# Patient Record
Sex: MALE | Race: WHITE
[De-identification: names, ages, dates, MRNs, and addresses within clinical notes are randomized per-mention and may not be internally consistent; named-entity substitution may affect disease eponyms.]

---

## 2017-03-28 ENCOUNTER — HOSPITAL ENCOUNTER (OUTPATIENT)
Dept: HOSPITAL 45 - C.ONC | Age: 67
Discharge: HOME | End: 2017-03-28
Attending: PHYSICIAN ASSISTANT
Payer: COMMERCIAL

## 2017-03-28 VITALS
SYSTOLIC BLOOD PRESSURE: 121 MMHG | HEART RATE: 84 BPM | DIASTOLIC BLOOD PRESSURE: 69 MMHG | TEMPERATURE: 98.42 F | OXYGEN SATURATION: 96 %

## 2017-03-28 DIAGNOSIS — Z08: Primary | ICD-10-CM

## 2017-03-28 DIAGNOSIS — Z92.3: ICD-10-CM

## 2017-03-28 DIAGNOSIS — Z85.46: ICD-10-CM

## 2017-03-28 NOTE — RADIATION ONCOLOGY FOLLOW-UP
Radiation Oncology Follow-Up


Date of Visit


Mar 28, 2017.





Reason For Visit


Annual follow-up





Radiation Completion Date


6/28/13





Diagnosis





(1) Prostate cancer


Status:  Resolved        Onset Date:  1/10/2013


Location:  right lobe of the prostate


Histology Subtype:  adenocarcinoma


Stage:  ll


Permanent Comment:  Rising PSA, pretreatment PSA 4.23


Status post biopsies.  Biopsy stage T2b, Jewell 3+4


Status post completion of radiation therapy utilizing IMRT/IGRT completed 06/28/ 2013 received 7840 cGy  Last Edited By: Sarah Leonard on Feb 9, 2016 14:02





Interim History


He has been doing well over this past year from urinary standpoint.  His AUA 

score was 3.  He completed expanded prostate cancer index composite for 

clinical practice with the aid of his brother.  He gave a score of 0 of 12 

urinary incontinence symptoms.  He gave a score of 0 of 12 and urinary 

irritation symptoms.  He gave a score of 0 12 bowel symptoms.  The sexual 

symptom category was not completed.  He gave a score of 0 12 and hormonal 

vitality symptoms.  His total with 0/48.  He had a PSA 09/01/2016 and that was 

0.175.





Allergies


Uncoded Allergies:  


     NKDA (Allergy, Unknown, 9/19/05)





Home Medications


Scheduled


Atorvastatin (Lipitor), 10 MG PO DAILY


Calcium (Caltrate), 600 MG PO DAILY


Carbidopa/Levodopa (Sinemet Cr 25MG/100MG), 1 TAB PO TID


Cholecalciferol (Vitamin D3), 2 CAP PO DAILY


Hydrochlorothiazide (Hctz), 25 MG PO DAILY


Lisinopril (Prinivil), 10 MG PO DAILY


Ocuvite Preservision (Ocuvite Preservision), 1 TAB PO BID


Selegiline Hcl (Eldepryl), 5 MG PO BID


Tocopheryl Acet,Dl-Alpha (Vitamin E), 400 INTER.UNIT PO DAILY


[co Q-10], 600 MG BID





Review of Systems


Gastrointestinal:  


   Symptoms:  WNL


   GI Comments:  No fiber supplements;


Oral:  


   Symptoms:  No Problems


Respiratory:  


   Symptoms:  WNL


Urinary:  


   Symptoms:  WNL


   Comments:  3 voids/night;


Skin:  


   Symptoms:  No Problems





Physical Exam





Vital Signs








  Date Time  Temp Pulse Resp B/P Pulse Ox O2 Delivery O2 Flow Rate FiO2


 


3/28/17 14:27 36.9 84 24 121/69 96   








Pain:  


   Pain Location:  None


   Patient Pain Scale:  0 - 10


   Initial Pain Intensity:  0.0


General Appearance:  no apparent distress


Eyes:  normal inspection, EOMI


ENT:  normal ENT inspection, + pertinent finding (very decreased hearing he 

does have some wax in the left ear.)


Neck:  no adenopathy, thyroid normal


Respiratory/Chest:  lungs clear, no respiratory distress, no accessory muscle 

use


Cardiovascular:  regular rate, rhythm, no gallop, no murmur


Abdomen:  non tender, soft


Anal / Rectum:


Rectal examination reveals prostate to be smooth without nodules no rectal 

masses no rectal bleeding.


Extremities:  no pedal edema


Neurologic/Psychiatric:  no motor/sensory deficits, alert, normal mood/affect


Skin:  warm/dry


Lymphatic:  no adenopathy





Laboratory Studies











Test


  3/28/17


14:43


 


Prostate Specific Antigen


  0.164 ng/ml


(0.000-4.000)











Assessment & Plan


Plan: A PSA was drawn today prior to examination he'll be notified as to 

results.  His brother has noted decreased hearing acuity.  He did have some wax 

on left.  I've asked him to see Dr. Gaston if his continued to have problems 

with his hearing.  He'll continue regular follow-up with Dr. Henry.  We 

asked him to return to our office in 1 year.  He may call if she has a 

questions or concerns in the interim.





Total Time In Follow-Up


I spent 20 minutes speaking to the patient performing examination.  I spent 15 

minutes reviewing information in completing this note.





Copy To


Dennis Gaston M.D.; Brady Henry MD

## 2017-04-20 ENCOUNTER — HOSPITAL ENCOUNTER (OUTPATIENT)
Dept: HOSPITAL 45 - C.LABBC | Age: 67
Discharge: HOME | End: 2017-04-20
Attending: INTERNAL MEDICINE
Payer: COMMERCIAL

## 2017-04-20 DIAGNOSIS — R73.03: ICD-10-CM

## 2017-04-20 DIAGNOSIS — E78.5: ICD-10-CM

## 2017-04-20 DIAGNOSIS — I10: ICD-10-CM

## 2017-04-20 DIAGNOSIS — G20: Primary | ICD-10-CM

## 2017-04-20 LAB
ALBUMIN/GLOB SERPL: 1.3 {RATIO} (ref 0.9–2)
ALP SERPL-CCNC: 60 U/L (ref 45–117)
ALT SERPL-CCNC: 26 U/L (ref 12–78)
ANION GAP SERPL CALC-SCNC: 5 MMOL/L (ref 3–11)
AST SERPL-CCNC: 16 U/L (ref 15–37)
BASOPHILS # BLD: 0.09 K/UL (ref 0–0.2)
BASOPHILS NFR BLD: 1.8 %
BUN SERPL-MCNC: 20 MG/DL (ref 7–18)
BUN/CREAT SERPL: 26.4 (ref 10–20)
CALCIUM SERPL-MCNC: 8.8 MG/DL (ref 8.5–10.1)
CHLORIDE SERPL-SCNC: 104 MMOL/L (ref 98–107)
CHOLEST/HDLC SERPL: 3 {RATIO}
CO2 SERPL-SCNC: 31 MMOL/L (ref 21–32)
COMPLETE: YES
CREAT SERPL-MCNC: 0.77 MG/DL (ref 0.6–1.4)
EOSINOPHIL NFR BLD AUTO: 296 K/UL (ref 130–400)
EST. AVERAGE GLUCOSE BLD GHB EST-MCNC: 126 MG/DL
GLOBULIN SER-MCNC: 3 GM/DL (ref 2.5–4)
GLUCOSE SERPL-MCNC: 110 MG/DL (ref 70–99)
GLUCOSE UR QL: 49 MG/DL
HCT VFR BLD CALC: 44 % (ref 42–52)
IG%: 0.2 %
IMM GRANULOCYTES NFR BLD AUTO: 19.4 %
KETONES UR QL STRIP: 84 MG/DL
LYMPHOCYTES # BLD: 0.99 K/UL (ref 1.2–3.4)
MCH RBC QN AUTO: 30.7 PG (ref 25–34)
MCHC RBC AUTO-ENTMCNC: 34.3 G/DL (ref 32–36)
MCV RBC AUTO: 89.4 FL (ref 80–100)
MONOCYTES NFR BLD: 8.8 %
NEUTROPHILS # BLD AUTO: 4.9 %
NEUTROPHILS NFR BLD AUTO: 64.9 %
NITRITE UR QL STRIP: 69 MG/DL (ref 0–150)
PH UR: 147 MG/DL (ref 0–200)
PMV BLD AUTO: 9.3 FL (ref 7.4–10.4)
POTASSIUM SERPL-SCNC: 4.3 MMOL/L (ref 3.5–5.1)
RBC # BLD AUTO: 4.92 M/UL (ref 4.7–6.1)
SODIUM SERPL-SCNC: 140 MMOL/L (ref 136–145)
TSH SERPL-ACNC: 1.26 UIU/ML (ref 0.3–4.5)
VERY LOW DENSITY LIPOPROT CALC: 14 MG/DL
WBC # BLD AUTO: 5.11 K/UL (ref 4.8–10.8)

## 2017-04-26 NOTE — CODING QUERY MEDICAL NECESSITY
SUPPORTING DIAGNOSIS NEEDED





A supporting diagnosis is required for the test/procedure performed on this patient in 
order for us to be reimbursed by the patient's insurance. Please provide a supporting 
diagnosis for the following test/procedure listed below next to the test name along with 
your signature. 



*If there is no additional diagnosis for this patient that would support the following 
test/procedure please document that below next to the test/procedure.



Test(s)/Procedure(s) that require a supporting diagnosis:

DOS 4/20

* Hba1c      DIAGNOSIS:

* TSH      DIAGNOSIS:

* Lipids      DIAGNOSIS:



Provider Signature:  ______________________________  Date:  _______



Thank you  

Juliana Sibley

Health Information Management

Phone:  [phone]

Fax:  [phone]



Once completed, please kindly fax back to [phone]



For questions please call [phone]

## 2017-09-05 ENCOUNTER — HOSPITAL ENCOUNTER (OUTPATIENT)
Dept: HOSPITAL 45 - C.LABBC | Age: 67
Discharge: HOME | End: 2017-09-05
Attending: UROLOGY
Payer: COMMERCIAL

## 2017-09-05 DIAGNOSIS — R39.9: ICD-10-CM

## 2017-09-05 DIAGNOSIS — C61: Primary | ICD-10-CM

## 2018-04-03 ENCOUNTER — HOSPITAL ENCOUNTER (OUTPATIENT)
Dept: HOSPITAL 45 - C.ONC | Age: 68
Discharge: HOME | End: 2018-04-03
Attending: PHYSICIAN ASSISTANT
Payer: COMMERCIAL

## 2018-04-03 VITALS
TEMPERATURE: 98.06 F | OXYGEN SATURATION: 96 % | HEART RATE: 84 BPM | DIASTOLIC BLOOD PRESSURE: 72 MMHG | SYSTOLIC BLOOD PRESSURE: 143 MMHG

## 2018-04-03 DIAGNOSIS — Z92.3: ICD-10-CM

## 2018-04-03 DIAGNOSIS — Z85.46: ICD-10-CM

## 2018-04-03 DIAGNOSIS — Z08: Primary | ICD-10-CM

## 2018-04-03 NOTE — RADIATION ONCOLOGY FOLLOW-UP
Radiation Oncology Follow-Up


Date of Visit


Apr 3, 2018.





Reason For Visit


Annual follow up





Radiation Completion Date


6/28/13





Diagnosis





(1) Prostate cancer


Status:  Resolved        Onset Date:  1/10/2013


Stage:  ll


Permanent Comment:  Rising PSA, pretreatment PSA 4.23


Status post biopsies.  Biopsy stage T2b, Jewell 3+4


Status post completion of radiation therapy utilizing IMRT/IGRT completed 06/28/ 2013 received 7840 cGy  Last Edited By: Sarah Leonard on Feb 9, 2016 14:02





Interim History


He has been doing well over this past year.  He has noted no change in urinary 

habits.  Today he gave an AUA score of 3.  He completed an expanded prostate 

cancer index composite for clinical practice and gave a score of 0 of 12 and 

urinary incontinence symptoms.  He gives score of 1 of 12 and urinary 

irritation symptoms.  He gave a score of 0 of 12 and bowel symptoms.  He did 

not complete the sexual questionnaire.  He gave a score 2 of 12 and hormonal 

vitality symptoms.  His total was 3 of 48.  His last PSA was September 5, 2017.

  That was 0.259.





Allergies


Uncoded Allergies:  


     NKDA (Allergy, Unknown, 9/19/05)





Home Medications


Scheduled


Atorvastatin (Lipitor), 10 MG PO DAILY


Calcium (Caltrate), 600 MG PO DAILY


Carbidopa/Levodopa (Sinemet Cr 25MG/100MG), 1 TAB PO TID


Cholecalciferol (Vitamin D3), 2 CAP PO DAILY


Hydrochlorothiazide (Hctz), 25 MG PO DAILY


Lisinopril (Prinivil), 10 MG PO DAILY


Ocuvite Preservision (Ocuvite Preservision), 1 TAB PO BID


Selegiline Hcl (Eldepryl), 5 MG PO BID


Tocopheryl Acet,Dl-Alpha (Vitamin E), 400 INTER.UNIT PO DAILY


[co Q-10], 600 MG BID





Review of Systems


Gastrointestinal:  


   Symptoms:  WNL


   GI Comments:  No fiber supplements;


Oral:  


   Symptoms:  No Problems


Respiratory:  


   Symptoms:  WNL


Urinary:  


   Symptoms:  Nocturia


   Comments:  3 or4 times a night


Skin:  


   Symptoms:  No Problems





Physical Exam





Vital Signs








  Date Time  Temp Pulse Resp B/P (MAP) Pulse Ox O2 Delivery O2 Flow Rate FiO2


 


4/3/18 12:57 36.7 84 18 143/72 96   








Fatigue:  None


General Appearance:  no apparent distress


Eyes:  normal inspection, EOMI


ENT:  normal ENT inspection, hearing grossly normal


Neck:  no adenopathy, thyroid normal


Respiratory/Chest:  lungs clear, no respiratory distress, no accessory muscle 

use


Cardiovascular:  regular rate, rhythm, no gallop, no murmur


Abdomen:  non tender, soft, no organomegaly


Anal / Rectum:


Normal sphincter tone.  No rectal masses no rectal bleeding.  Prostate without 

nodules.


Extremities:  no pedal edema


Neurologic/Psychiatric:  no motor/sensory deficits, alert, normal mood/affect





Pain Management


Patient Reports Pain:  No


Pain Location:  None


Patient Preferred Pain Scale:  0 - 10


Initial Pain Intensity:  0.0


Pain Management Plan


He denies pain therefore requires no pain management.





Laboratory


Laboratory Results:  were reviewed, and pertinent findings noted below


Laboratory Comments:











Test


  4/3/18


13:10


 


Prostate Specific Antigen


  0.154 ng/ml


(0.000-4.000)











Pathology


Pathology Results:  not applicable





Imaging


Imaging Studies:  not applicable





Assessment & Plan


Plan: Continue regular follow-up with Dr. Henry.  He has an appointment on 

September 25.  He will have a PSA prior to that visit.  He does not require any 

medication to help with urination.  He is accompanied today with his brother.  

We discussed continue follow-up now with urology alone.  A follow-up 

appointment with our office was not given.  We had previously discussed 

biochemical failure.  We once again reviewed this information.  The PSA was 

drawn today prior to examination.  He will be notified as to the results.  They 

may call our office if they have any questions or concerns would be happy to 

speak to them.





Total Time


In Follow-Up


I spent 20 minutes speaking to the patient and his brother and performing 

examination.  I spent 15 minutes reviewing information and completing this note.





Copy To


Dennis Gaston M.D.; Brady Henry MD

## 2018-05-03 ENCOUNTER — HOSPITAL ENCOUNTER (OUTPATIENT)
Dept: HOSPITAL 45 - C.LABBC | Age: 68
Discharge: HOME | End: 2018-05-03
Attending: INTERNAL MEDICINE
Payer: COMMERCIAL

## 2018-05-03 DIAGNOSIS — R73.03: Primary | ICD-10-CM

## 2018-05-03 DIAGNOSIS — E78.5: ICD-10-CM

## 2018-05-03 DIAGNOSIS — Z11.59: ICD-10-CM

## 2018-05-03 DIAGNOSIS — I10: ICD-10-CM

## 2018-05-03 LAB
ALBUMIN SERPL-MCNC: 4 GM/DL (ref 3.4–5)
ALP SERPL-CCNC: 75 U/L (ref 45–117)
ALT SERPL-CCNC: 34 U/L (ref 12–78)
AST SERPL-CCNC: 21 U/L (ref 15–37)
BUN SERPL-MCNC: 20 MG/DL (ref 7–18)
CALCIUM SERPL-MCNC: 9.4 MG/DL (ref 8.5–10.1)
CO2 SERPL-SCNC: 30 MMOL/L (ref 21–32)
CREAT SERPL-MCNC: 0.82 MG/DL (ref 0.6–1.4)
GLUCOSE SERPL-MCNC: 103 MG/DL (ref 70–99)
HBA1C MFR BLD: 6.1 % (ref 4.5–5.6)
KETONES UR QL STRIP: 87 MG/DL
PH UR: 161 MG/DL (ref 0–200)
POTASSIUM SERPL-SCNC: 4 MMOL/L (ref 3.5–5.1)
PROT SERPL-MCNC: 7.6 GM/DL (ref 6.4–8.2)
SODIUM SERPL-SCNC: 137 MMOL/L (ref 136–145)

## 2018-05-18 ENCOUNTER — HOSPITAL ENCOUNTER (OUTPATIENT)
Dept: HOSPITAL 45 - C.RADBC | Age: 68
Discharge: HOME | End: 2018-05-18
Attending: INTERNAL MEDICINE
Payer: COMMERCIAL

## 2018-05-18 DIAGNOSIS — M17.12: ICD-10-CM

## 2018-05-18 DIAGNOSIS — M16.10: ICD-10-CM

## 2018-05-18 DIAGNOSIS — M11.252: ICD-10-CM

## 2018-05-18 DIAGNOSIS — M47.896: Primary | ICD-10-CM

## 2018-05-18 NOTE — DIAGNOSTIC IMAGING REPORT
L KNEE 1 OR 2 VIEWS ROUTINE



HISTORY:  67 years-old Male M79.605 Left leg pain acute left knee pain



COMPARISON: None available



TECHNIQUE: 2 views of the left knee



FINDINGS: 

Mild lateral compartment with moderate medial and patellofemoral compartment

osteoarthritis. Chondrocalcinosis is also seen. Small joint effusion with mild

circumferential soft tissue swelling about the knee, greatest anteromedially. 3

mm calcification about the medial femoral condyle suggesting dystrophic

calcification. No acute fracture or dislocation. Peripheral arterial

calcifications noted.



IMPRESSION: 

1. Tricompartment osteoarthritis without acute fracture or dislocation.

2. Small joint effusion with mild anteromedial soft tissue swelling.

3. Peripheral arterial disease. 







The above report was generated using voice recognition software. It may contain

grammatical, syntax or spelling errors.







Electronically signed by:  Junior Baer M.D.

5/18/2018 12:24 PM



Dictated Date/Time:  5/18/2018 12:23 PM

## 2018-05-18 NOTE — DIAGNOSTIC IMAGING REPORT
R HIP UNILATERAL 2 VIEWS, L HIP UNILATERAL 2 VIEWS



HISTORY:  67 years-old Male PAIN chronic bilateral hip pain



COMPARISON: None available



TECHNIQUE: 2 views of the bilateral hips



FINDINGS: 



RIGHT:

Metallic seeds overlie the distribution of the prostate. Mild to moderate right

and posterior arthritis. No acute fracture or dislocation.



LEFT:

Mild to moderate osteoarthritis with chondrocalcinosis of the femoral acetabular

joint. No acute fracture or dislocation. No opaque foreign body.



IMPRESSION: Mild to moderate bilateral hip osteoarthritis without acute fracture

or dislocation. 







The above report was generated using voice recognition software. It may contain

grammatical, syntax or spelling errors.







Electronically signed by:  Junior Baer M.D.

5/18/2018 12:00 PM



Dictated Date/Time:  5/18/2018 11:58 AM

## 2018-05-18 NOTE — DIAGNOSTIC IMAGING REPORT
L-SPINE MIN 4 VIEWS ROUTINE



CLINICAL HISTORY: 67 years-old Male presenting with M54.5 Bilateral low back

pain without sciatica. 



TECHNIQUE: Frontal, bilateral oblique, lateral, and coned in lateral views of

the lumbar spine were obtained.



COMPARISON: None.



FINDINGS:

Cholecystectomy clips noted. Dextrocurvature of the lumbar spine. Normal lumbar

lordosis. Vertebral bodies maintain normal height and alignment. Intervertebral

disc heights preserved. Mild anterior osteophytosis. No radiographic evidence of

osseous neural foraminal narrowing. No compression deformity. No subluxation.



Calculus suggested at the lower pole of the left kidney.



IMPRESSION:

1.  Mild multilevel degenerative changes of the lumbar spine there is no

radiographic evidence of acute osseous injury.



2.  Suspected left renal calculus.







Electronically signed by:  Dennis Mcclellan M.D.

5/18/2018 12:07 PM



Dictated Date/Time:  5/18/2018 12:05 PM